# Patient Record
Sex: FEMALE | Race: WHITE | URBAN - METROPOLITAN AREA
[De-identification: names, ages, dates, MRNs, and addresses within clinical notes are randomized per-mention and may not be internally consistent; named-entity substitution may affect disease eponyms.]

---

## 2017-06-15 ENCOUNTER — THERAPY VISIT (OUTPATIENT)
Dept: PHYSICAL THERAPY | Facility: CLINIC | Age: 72
End: 2017-06-15
Payer: MEDICARE

## 2017-06-15 DIAGNOSIS — M79.662 PAIN OF LEFT LOWER LEG: Primary | ICD-10-CM

## 2017-06-15 PROCEDURE — 97110 THERAPEUTIC EXERCISES: CPT | Mod: GP | Performed by: PHYSICAL THERAPIST

## 2017-06-15 PROCEDURE — 97116 GAIT TRAINING THERAPY: CPT | Mod: GP | Performed by: PHYSICAL THERAPIST

## 2017-06-15 PROCEDURE — 97161 PT EVAL LOW COMPLEX 20 MIN: CPT | Mod: GP | Performed by: PHYSICAL THERAPIST

## 2017-06-15 PROCEDURE — G8979 MOBILITY GOAL STATUS: HCPCS | Mod: GP | Performed by: PHYSICAL THERAPIST

## 2017-06-15 PROCEDURE — G8978 MOBILITY CURRENT STATUS: HCPCS | Mod: GP | Performed by: PHYSICAL THERAPIST

## 2017-06-15 NOTE — PROGRESS NOTES
[facility] Initial Evaluation      Subjective:    Patient is a 71 year old female presenting with rehab back hpi.   Lori Amos is a 71 year old female with a lumbar (pelvis) condition.  Condition occurred with:  A fall/slip.  Condition occurred: in the community.  This is a new condition  Pt had a fall resulting in 3 pelvic fractures on R side when on a trip to [place] but recently began having recurrent L hip/leg pain saw MD on 6/14/17 for this issue..    Patient reports pain:  Lumbar spine left.  Radiates to:  Gluteals left.  Pain is described as sharp and is intermittent and reported as 5/10.  Associated symptoms:  Loss of strength and loss of balance. Pain is the same all the time.  Symptoms are exacerbated by certain positions, walking and twisting and relieved by rest.  Since onset symptoms are gradually improving.  Special tests:  X-ray.  Previous treatment includes physical therapy.  There was significant improvement following previous treatment.  General health as reported by patient is excellent.  Pertinent medical history includes:  Thyroid problems and depression.  Medical allergies: no.  Other surgeries include:  No.  Current medications:  Thyroid medication, pain medication and anti-depressants.  Current occupation is Retired RN.    Employment tasks: household activities.    Barriers include:  None as reported by the patient.    Red flags:  None as reported by the patient.        LUMBAR:    Posture: guarded sitting and standing posture with high guard posture during walking and R lateral lean    Neurological:    Motor Deficit:  Myotomes L R   L1-2 (hip flexion) 5/5 5/5   L3 (knee extension) 5/5 5/5   L4 (ankle DF) 5/5 5/5   L5 (g. toe ext)     S1 (ankle PF or knee flex) 5/5 5/5   MMT hip abd and ext 4-/5 bilaterally    Sensory Deficit, Reflexes: normal light touch senstion    Dural Signs:   L R   Slump neg neg   SLR + neg   Other:     AROM: (Major, Moderate, Minimal or Nil  loss)  Movement Loss Darryn Mod Min Nil Pain   Flexion    x    Extension    x    Side Gliding L   x  +   Side Gliding R    x      Repeated movement testing:   (During: produces, abolishes, increases, decreases, no effect, centralizing, peripheralizing; After: better, worse, no better, no worse, no effect, centralized, peripheralized)    Pre-test Symptoms Standing:    Symptoms During Symptoms After ROM increased ROM decreased No Effect   FIS     x   Rep FIS     x   EIS     x   Rep EIS     x     If required Pre-test Symptoms:    Symptoms During Symptoms After ROM increased ROM decreased No Effect   SGIS - L x       Rep SGIS - L x    same   SGIS - R     x   Rep SGIS - R     x     Static Tests: TTP L sacrum to greater tuberosity  Other Tests: difficulty with transfer sit<>stand and sup<>sit including lateral bed mobility due to pain, time spent discussing AD    Hip strengthening, aerobic activity as tolerated, gait with AD                      Objective:    System    Physical Exam    General     ROS    Assessment/Plan:      Patient is a 71 year old female with lumbar and sacral complaints.    Patient has the following significant findings with corresponding treatment plan.                Diagnosis 1:  L leg pain  Pain -  hot/cold therapy, manual therapy, education and home program  Decreased ROM/flexibility - manual therapy, therapeutic exercise, therapeutic activity and home program  Decreased joint mobility - manual therapy, therapeutic exercise, therapeutic activity and home program  Decreased strength - therapeutic exercise, therapeutic activities and home program  Impaired gait - gait training, assistive devices and home program    Therapy Evaluation Codes:   1) History comprised of:   Personal factors that impact the plan of care:      Coping style.    Comorbidity factors that impact the plan of care are:      Depression.     Medications impacting care: Anti-depressant and Pain.  2) Examination of Body Systems  comprised of:   Body structures and functions that impact the plan of care:      Lumbar spine.   Activity limitations that impact the plan of care are:      Stairs and Walking.  3) Clinical presentation characteristics are:   Stable/Uncomplicated.  4) Decision-Making    Low complexity using standardized patient assessment instrument and/or measureable assessment of functional outcome.  Cumulative Therapy Evaluation is: Low complexity.    Previous and current functional limitations:  (See Goal Flow Sheet for this information)    Short term and Long term goals: (See Goal Flow Sheet for this information)     Communication ability:  Patient appears to be able to clearly communicate and understand verbal and written communication and follow directions correctly.  Treatment Explanation - The following has been discussed with the patient:   RX ordered/plan of care  Anticipated outcomes  Possible risks and side effects  This patient would benefit from PT intervention to resume normal activities.   Rehab potential is good.    Frequency:  1 X week, once daily  Duration:  for 12 weeks  Discharge Plan:  Achieve all LTG.  Independent in home treatment program.  Return to previous functional level by discharge.  Reach maximal therapeutic benefit.    Please refer to the daily flowsheet for treatment today, total treatment time and time spent performing 1:1 timed codes.

## 2017-06-15 NOTE — LETTER
[facility]  [address]  [place] [address]  [phone]    2017    Re: Lori Amos   :   1945  MRN:  [id number]   REFERRING PHYSICIAN:   [facility]    Date of Initial Evaluation:  6/15/17  Visits:  Rxs Used: 1  Reason for Referral:  Pain of left lower leg    EVALUATION SUMMARY    [facility] Initial Evaluation    Subjective:  Patient is a 71 year old female presenting with rehab back hpi.   Lori Amos is a 71 year old female with a lumbar (pelvis) condition.  Condition occurred with:  A fall/slip.  Condition occurred: in the community.  This is a new condition  Pt had a fall resulting in 3 pelvic fractures on R side when on a trip to [place] but recently began having recurrent L hip/leg pain saw MD on 17 for this issue..    Patient reports pain:  Lumbar spine left.  Radiates to:  Gluteals left.  Pain is described as sharp and is intermittent and reported as 5/10.  Associated symptoms:  Loss of strength and loss of balance. Pain is the same all the time.  Symptoms are exacerbated by certain positions, walking and twisting and relieved by rest.  Since onset symptoms are gradually improving.  Special tests:  X-ray.  Previous treatment includes physical therapy.  There was significant improvement following previous treatment.  General health as reported by patient is excellent.  Pertinent medical history includes:  Thyroid problems and depression.  Medical allergies: no.  Other surgeries include:  No.  Current medications:  Thyroid medication, pain medication and anti-depressants.  Current occupation is Retired RN.    Employment tasks: household activities.    Barriers include:  None as reported by the patient.  Red flags:  None as reported by the patient.    LUMBAR:  Posture: guarded sitting and standing posture with high guard posture during walking and R lateral lean    Neurological:  Motor Deficit:  Myotomes  L R   L1-2 (hip flexion) 5/5 5/5   L3 (knee extension) 5/5 5/5   L4 (ankle DF) 5/5 5/5   L5 (g. toe ext)     S1 (ankle PF or knee flex) 5/5 5/5   MMT hip abd and ext 4-/5 bilaterally    Sensory Deficit, Reflexes: normal light touch senstion    Dural Signs:   L R   Slump neg neg   SLR + neg   Other:     AROM: (Major, Moderate, Minimal or Nil loss)  Movement Loss Darrny Mod Min Nil Pain   Flexion    x    Extension    x    Side Gliding L   x  +   Side Gliding R    x      Repeated movement testing:   (During: produces, abolishes, increases, decreases, no effect, centralizing, peripheralizing; After: better, worse, no better, no worse, no effect, centralized, peripheralized)    Pre-test Symptoms Standing:    Symptoms During Symptoms After ROM increased ROM decreased No Effect   FIS     x   Rep FIS     x   EIS     x   Rep EIS     x     If required Pre-test Symptoms:    Symptoms During Symptoms After ROM increased ROM decreased No Effect   SGIS - L x       Rep SGIS - L x    same   SGIS - R     x   Rep SGIS - R     x     Static Tests: TTP L sacrum to greater tuberosity  Other Tests: difficulty with transfer sit<>stand and sup<>sit including lateral bed mobility due to pain, time spent discussing AD  Hip strengthening, aerobic activity as tolerated, gait with AD    Assessment/Plan:    Patient is a 71 year old female with lumbar and sacral complaints.    Patient has the following significant findings with corresponding treatment plan.                Diagnosis 1:  L leg pain  Pain -  hot/cold therapy, manual therapy, education and home program  Decreased ROM/flexibility - manual therapy, therapeutic exercise, therapeutic activity and home program  Decreased joint mobility - manual therapy, therapeutic exercise, therapeutic activity and home program  Decreased strength - therapeutic exercise, therapeutic activities and home program  Impaired gait - gait training, assistive devices and home program    Therapy Evaluation Codes:    1) History comprised of:   Personal factors that impact the plan of care:      Coping style.    Comorbidity factors that impact the plan of care are:      Depression.     Medications impacting care: Anti-depressant and Pain.  2) Examination of Body Systems comprised of:   Body structures and functions that impact the plan of care:      Lumbar spine.   Activity limitations that impact the plan of care are:      Stairs and Walking.  3) Clinical presentation characteristics are:   Stable/Uncomplicated.  4) Decision-Making    Low complexity using standardized patient assessment instrument and/or measureable assessment of functional outcome.  Cumulative Therapy Evaluation is: Low complexity.    Previous and current functional limitations:  (See Goal Flow Sheet for this information)    Short term and Long term goals: (See Goal Flow Sheet for this information)     Communication ability:  Patient appears to be able to clearly communicate and understand verbal and written communication and follow directions correctly.  Treatment Explanation - The following has been discussed with the patient:   RX ordered/plan of care  Anticipated outcomes  Possible risks and side effects  This patient would benefit from PT intervention to resume normal activities.   Rehab potential is good.    Frequency:  1 X week, once daily  Duration:  for 12 weeks  Discharge Plan:  Achieve all LTG.  Independent in home treatment program.  Return to previous functional level by discharge.  Reach maximal therapeutic benefit.    Thank you for your referral.    INQUIRIES  Therapist: Christian David DPT [facility]  [address] [place] [address]  Phone: [phone]  Fax: [phone]

## 2017-06-15 NOTE — MR AVS SNAPSHOT
"              After Visit Summary   6/15/2017    Lori Amos    MRN: [id number]           Patient Information     Date Of Birth          1945        Visit Information        Provider Department      6/15/2017 4:50 PM Christian David, PT [facility]        Today's Diagnoses     Pain of left lower leg    -  1       Follow-ups after your visit        Your next 10 appointments already scheduled     Jun 22, 2017  4:50 PM CDT   TOR Extremity with Christian David, PT   [facility] ([facility]    [address]  [place] [address]   [phone]            Jun 29, 2017  4:10 PM CDT   TOR Extremity with Christian David, PT   [facility] ([facility]    [address]  [place] [address]   [phone]            Jul 06, 2017 10:20 AM CDT   TOR Extremity with Christian David, PT   [facility] ([facility]    [address]  [place] [address]   [phone]              Who to contact     If you have questions or need follow up information about today's clinic visit or your schedule please contact [facility] directly at [phone].  Normal or non-critical lab and imaging results will be communicated to you by [employer], letter or phone within 4 business days after the clinic has received the results. If you do not hear from us within 7 days, please contact the clinic through [employer] or phone. If you have a critical or abnormal lab result, we will notify you by phone as soon as possible.  Submit refill requests through [employer] or call your pharmacy and they will forward the refill request to us. Please allow 3 business days for your refill to be completed.          Additional Information About Your Visit        [employer] Information     [employer] lets you send messages to your doctor, view your test results, renew your prescriptions, schedule appointments and more. To sign up, go to [email or web address] . Click on \"Log in\" on " "the left side of the screen, which will take you to the Welcome page. Then click on \"Sign up Now\" on the right side of the page.     You will be asked to enter the access code listed below, as well as some personal information. Please follow the directions to create your username and password.     Your access code is: [email or web address]  Expires: 2017  6:52 PM     Your access code will  in 90 days. If you need help or a new code, please call your [facility] or [phone].        Care EveryWhere ID     This is your Care EveryWhere ID. This could be used by other organizations to access your [facility] medical records  [id number]         Blood Pressure from Last 3 Encounters:   No data found for BP    Weight from Last 3 Encounters:   No data found for Wt              We Performed the Following     GAIT TRAINING THERAPY     HC PT EVAL, LOW COMPLEXITY     TOR CERT REPORT     TOR INITIAL EVAL REPORT     Mobility current Status     Mobility goal Status     THERAPEUTIC EXERCISES        Primary Care Provider    None Specified       No primary provider on file.        Thank you!     Thank you for choosing [facility]  for your care. Our goal is always to provide you with excellent care. Hearing back from our patients is one way we can continue to improve our services. Please take a few minutes to complete the written survey that you may receive in the mail after your visit with us. Thank you!             Your Updated Medication List - Protect others around you: Learn how to safely use, store and throw away your medicines at www.disposemymeds.org.      Notice  As of 6/15/2017  6:52 PM    You have not been prescribed any medications.      "

## 2017-06-15 NOTE — LETTER
DEPARTMENT OF HEALTH AND HUMAN SERVICES  [facility] FOR MEDICARE & MEDICAID SERVICES    PLAN/UPDATED PLAN OF PROGRESS FOR OUTPATIENT REHABILITATION    PATIENTS NAME:  Lori Amos     : 1945    PROVIDER NUMBER:    [id number]    [facility]    PROVIDER NAME: [facility]    MEDICAL RECORD NUMBER: [id number]     START OF CARE DATE:  SOC Date: 06/15/17   TYPE:  PT    PRIMARY/TREATMENT DIAGNOSIS: (Pertinent Medical Diagnosis)  Pain of left lower leg    VISITS FROM START OF CARE:  Rxs Used: 1     [facility] Initial Evaluation    Subjective:  Patient is a 71 year old female presenting with rehab back hpi.   Lori Amos is a 71 year old female with a lumbar (pelvis) condition.  Condition occurred with:  A fall/slip.  Condition occurred: in the community.  This is a new condition  Pt had a fall resulting in 3 pelvic fractures on R side when on a trip to [place] but recently began having recurrent L hip/leg pain saw MD on 17 for this issue..    Patient reports pain:  Lumbar spine left.  Radiates to:  Gluteals left.  Pain is described as sharp and is intermittent and reported as 5/10.  Associated symptoms:  Loss of strength and loss of balance. Pain is the same all the time.  Symptoms are exacerbated by certain positions, walking and twisting and relieved by rest.  Since onset symptoms are gradually improving.  Special tests:  X-ray.  Previous treatment includes physical therapy.  There was significant improvement following previous treatment.  General health as reported by patient is excellent.  Pertinent medical history includes:  Thyroid problems and depression.  Medical allergies: no.  Other surgeries include:  No.  Current medications:  Thyroid medication, pain medication and anti-depressants.  Current occupation is Retired RN.    Employment tasks: household activities.  Barriers include:  None as reported by the patient.  Red flags:  None as reported by the  patient.    LUMBAR:    Posture: guarded sitting and standing posture with high guard posture during walking and R lateral lean    Neurological:    Motor Deficit:  Myotomes L R   L1-2 (hip flexion) 5/5 5/5   L3 (knee extension) 5/5 5/5   L4 (ankle DF) 5/5 5/5   L5 (g. toe ext)     S1 (ankle PF or knee flex) 5/5 5/5   MMT hip abd and ext 4-/5 bilaterally    Sensory Deficit, Reflexes: normal light touch senstion    Dural Signs:   L R   Slump neg neg   SLR + neg   Other:     AROM: (Major, Moderate, Minimal or Nil loss)  Movement Loss Darryn Mod Min Nil Pain   Flexion    x    Extension    x    Side Gliding L   x  +   Side Gliding R    x      Repeated movement testing:   (During: produces, abolishes, increases, decreases, no effect, centralizing, peripheralizing; After: better, worse, no better, no worse, no effect, centralized, peripheralized)    Pre-test Symptoms Standing:    Symptoms During Symptoms After ROM increased ROM decreased No Effect   FIS     x   Rep FIS     x   EIS     x   Rep EIS     x     If required Pre-test Symptoms:    Symptoms During Symptoms After ROM increased ROM decreased No Effect   SGIS - L x       Rep SGIS - L x    same   SGIS - R     x   Rep SGIS - R     x     Static Tests: TTP L sacrum to greater tuberosity    Other Tests: difficulty with transfer sit<>stand and sup<>sit including lateral bed mobility due to pain, time spent discussing AD    Hip strengthening, aerobic activity as tolerated, gait with AD    Assessment/Plan:    Patient is a 71 year old female with lumbar and sacral complaints.    Patient has the following significant findings with corresponding treatment plan.                Diagnosis 1:  L leg pain  Pain -  hot/cold therapy, manual therapy, education and home program  Decreased ROM/flexibility - manual therapy, therapeutic exercise, therapeutic activity and home program  Decreased joint mobility - manual therapy, therapeutic exercise, therapeutic activity and home  program  Decreased strength - therapeutic exercise, therapeutic activities and home program  Impaired gait - gait training, assistive devices and home program    Therapy Evaluation Codes:   1) History comprised of:   Personal factors that impact the plan of care:      Coping style.    Comorbidity factors that impact the plan of care are:      Depression.     Medications impacting care: Anti-depressant and Pain.  2) Examination of Body Systems comprised of:   Body structures and functions that impact the plan of care:      Lumbar spine.   Activity limitations that impact the plan of care are:      Stairs and Walking.  3) Clinical presentation characteristics are:   Stable/Uncomplicated.  4) Decision-Making    Low complexity using standardized patient assessment instrument and/or measureable assessment of functional outcome.    Cumulative Therapy Evaluation is: Low complexity.  Previous and current functional limitations:  (See Goal Flow Sheet for this information)    Short term and Long term goals: (See Goal Flow Sheet for this information)   Communication ability:  Patient appears to be able to clearly communicate and understand verbal and written communication and follow directions correctly.  Treatment Explanation - The following has been discussed with the patient:   RX ordered/plan of care  Anticipated outcomes  Possible risks and side effects  This patient would benefit from PT intervention to resume normal activities.   Rehab potential is good.    Frequency:  1 X week, once daily  Duration:  for 12 weeks  Discharge Plan:  Achieve all LTG.  Independent in home treatment program.  Return to previous functional level by discharge.  Reach maximal therapeutic benefit.      Caregiver Signature/Credentials _____________________________ Date ________       Treating Provider: Christian VICTORT   I have reviewed and certified the need for these services and plan of treatment while under my care.        PHYSICIAN'S  "SIGNATURE:   _________________________________________  Date___________   Sally Horta    Certification period:  Beginning of Cert date period: 06/15/17 to  End of Cert period date: 09/12/17     Functional Level Progress Report: Please see attached \"Goal Flow sheet for Functional level.\"    ____X____ Continue Services or       ________ DC Services                Service dates: From  SOC Date: 06/15/17 date to present                         "

## 2017-06-22 ENCOUNTER — THERAPY VISIT (OUTPATIENT)
Dept: PHYSICAL THERAPY | Facility: CLINIC | Age: 72
End: 2017-06-22
Payer: MEDICARE

## 2017-06-22 DIAGNOSIS — M79.662 PAIN OF LEFT LOWER LEG: ICD-10-CM

## 2017-06-22 PROCEDURE — 97140 MANUAL THERAPY 1/> REGIONS: CPT | Mod: GP | Performed by: PHYSICAL THERAPIST

## 2017-06-22 PROCEDURE — 97110 THERAPEUTIC EXERCISES: CPT | Mod: GP | Performed by: PHYSICAL THERAPIST

## 2017-07-06 ENCOUNTER — THERAPY VISIT (OUTPATIENT)
Dept: PHYSICAL THERAPY | Facility: CLINIC | Age: 72
End: 2017-07-06
Payer: MEDICARE

## 2017-07-06 DIAGNOSIS — M79.662 PAIN OF LEFT LOWER LEG: ICD-10-CM

## 2017-07-06 PROCEDURE — 97110 THERAPEUTIC EXERCISES: CPT | Mod: GP | Performed by: PHYSICAL THERAPIST

## 2017-07-06 PROCEDURE — 97140 MANUAL THERAPY 1/> REGIONS: CPT | Mod: GP | Performed by: PHYSICAL THERAPIST

## 2017-07-06 NOTE — MR AVS SNAPSHOT
"              After Visit Summary   7/6/2017    Lori Amos    MRN: [id number]           Patient Information     Date Of Birth          1945        Visit Information        Provider Department      7/6/2017 10:20 AM Christian David, PT [facility]        Today's Diagnoses     Pain of left lower leg           Follow-ups after your visit        Your next 10 appointments already scheduled     Jul 24, 2017 11:40 AM CDT   TOR Extremity with Christian David, PT   [facility] ([facility]    [address]  [place] [address]   [phone]            Aug 03, 2017  4:10 PM CDT   TOR Extremity with Christian David, PT   [facility] ([facility]    [address]  [place] [address]   [phone]            Aug 08, 2017 10:20 AM CDT   TOR Extremity with Christian David, PT   [facility] ([facility]    [address]  [place] [address]   [phone]              Who to contact     If you have questions or need follow up information about today's clinic visit or your schedule please contact [facility] directly at [phone].  Normal or non-critical lab and imaging results will be communicated to you by MyChart, letter or phone within 4 business days after the clinic has received the results. If you do not hear from us within 7 days, please contact the clinic through [facility] or phone. If you have a critical or abnormal lab result, we will notify you by phone as soon as possible.  Submit refill requests through G-CON or call your pharmacy and they will forward the refill request to us. Please allow 3 business days for your refill to be completed.          Additional Information About Your Visit        G-CON Information     G-CON lets you send messages to your doctor, view your test results, renew your prescriptions, schedule appointments and more. To sign up, go to [email or web address] . Click on \"Log in\" on the " "left side of the screen, which will take you to the Welcome page. Then click on \"Sign up Now\" on the right side of the page.     You will be asked to enter the access code listed below, as well as some personal information. Please follow the directions to create your username and password.     Your access code is: [email or web address]  Expires: 2017  6:52 PM     Your access code will  in 90 days. If you need help or a new code, please call your [facility] clinic or [phone].        Care EveryWhere ID     This is your Care EveryWhere ID. This could be used by other organizations to access your [facility] medical records  [id number]         Blood Pressure from Last 3 Encounters:   No data found for BP    Weight from Last 3 Encounters:   No data found for Wt              We Performed the Following     MANUAL THER TECH,1+REGIONS,EA 15 MIN     THERAPEUTIC EXERCISES        Primary Care Provider    None Specified       No primary provider on file.        Equal Access to Services     [facility]: [facility] [facility] [place], wabrianna lawrence, qaedilberto kaalmakenya torres, delfino lunsford . So [facility] [phone].    ATENCIÓN: Si habla español, tiene a purvis disposición servicios gratuitos de asistencia lingüística. Llame al [phone].    We comply with applicable federal civil rights laws and [place] laws. We do not discriminate on the basis of race, color, national origin, age, disability sex, sexual orientation or gender identity.            Thank you!     Thank you for choosing [facility]  for your care. Our goal is always to provide you with excellent care. Hearing back from our patients is one way we can continue to improve our services. Please take a few minutes to complete the written survey that you may receive in the mail after your visit with us. Thank you!             Your Updated Medication List - Protect others around you: Learn how to safely use, store and throw " away your medicines at www.disposemymeds.org.      Notice  As of 7/6/2017 11:31 AM    You have not been prescribed any medications.

## 2017-08-28 PROBLEM — M79.662 PAIN OF LEFT LOWER LEG: Status: RESOLVED | Noted: 2017-06-15 | Resolved: 2017-08-28

## 2017-08-28 NOTE — PROGRESS NOTES
Subjective:    HPI                    Objective:    System    Physical Exam    General     ROS    Assessment/Plan:      DISCHARGE REPORT    Progress reporting period is from 6/15/17 to 7/6/17.     SUBJECTIVE  Subjective: pt reports she has been hurting badly, ok in the morning but increased pain as the day goes on.   Current Pain level: 5/10   Initial Pain level: 5/10   Changes in function: No changes noted in function since last SOAP note   Adverse reactions: None;   ,     The objective findings are from DOS 7/6/17.    OBJECTIVE  Objective: increased L PSIS pain and glute max pain throughout session. pt emotional today about friends situations requiring encouragement and support throughout session.      ASSESSMENT/PLAN  Updated problem list and treatment plan: Diagnosis 1:  Hip and leg pain  STG/LTGs have been met or progress has been made towards goals:  None  Assessment of Progress: The patient's condition is unchanged.  Self Management Plans:  Patient has been instructed in a home treatment program.  Lori continues to require the following intervention to meet STG and LTG's: HEP    Recommendations:  This patient would benefit from further evaluation.    Please refer to the daily flowsheet for treatment today, total treatment time and time spent performing 1:1 timed codes.

## 2018-11-15 ENCOUNTER — RECORDS - HEALTHEAST (OUTPATIENT)
Dept: LAB | Facility: CLINIC | Age: 73
End: 2018-11-15

## 2018-11-16 LAB — PLATELET # BLD AUTO: 243 THOU/UL (ref 140–440)

## 2018-11-19 ENCOUNTER — RECORDS - HEALTHEAST (OUTPATIENT)
Dept: LAB | Facility: CLINIC | Age: 73
End: 2018-11-19

## 2018-11-20 LAB
ANION GAP SERPL CALCULATED.3IONS-SCNC: 9 MMOL/L (ref 5–18)
BASOPHILS # BLD AUTO: 0 THOU/UL (ref 0–0.2)
BASOPHILS NFR BLD AUTO: 1 % (ref 0–2)
BUN SERPL-MCNC: 12 MG/DL (ref 8–28)
CALCIUM SERPL-MCNC: 9.3 MG/DL (ref 8.5–10.5)
CHLORIDE BLD-SCNC: 106 MMOL/L (ref 98–107)
CO2 SERPL-SCNC: 27 MMOL/L (ref 22–31)
CREAT SERPL-MCNC: 0.47 MG/DL (ref 0.6–1.1)
EOSINOPHIL # BLD AUTO: 0.3 THOU/UL (ref 0–0.4)
EOSINOPHIL NFR BLD AUTO: 6 % (ref 0–6)
ERYTHROCYTE [DISTWIDTH] IN BLOOD BY AUTOMATED COUNT: 14.1 % (ref 11–14.5)
FERRITIN SERPL-MCNC: 78 NG/ML (ref 10–130)
GFR SERPL CREATININE-BSD FRML MDRD: >60 ML/MIN/1.73M2
GLUCOSE BLD-MCNC: 78 MG/DL (ref 70–125)
HCT VFR BLD AUTO: 38.3 % (ref 35–47)
HGB BLD-MCNC: 11.9 G/DL (ref 12–16)
LYMPHOCYTES # BLD AUTO: 1.2 THOU/UL (ref 0.8–4.4)
LYMPHOCYTES NFR BLD AUTO: 23 % (ref 20–40)
MCH RBC QN AUTO: 30.2 PG (ref 27–34)
MCHC RBC AUTO-ENTMCNC: 31.1 G/DL (ref 32–36)
MCV RBC AUTO: 97 FL (ref 80–100)
MONOCYTES # BLD AUTO: 0.5 THOU/UL (ref 0–0.9)
MONOCYTES NFR BLD AUTO: 9 % (ref 2–10)
NEUTROPHILS # BLD AUTO: 3.1 THOU/UL (ref 2–7.7)
NEUTROPHILS NFR BLD AUTO: 62 % (ref 50–70)
PLATELET # BLD AUTO: 274 THOU/UL (ref 140–440)
PMV BLD AUTO: 10 FL (ref 8.5–12.5)
POTASSIUM BLD-SCNC: 3.9 MMOL/L (ref 3.5–5)
RBC # BLD AUTO: 3.94 MILL/UL (ref 3.8–5.4)
SODIUM SERPL-SCNC: 142 MMOL/L (ref 136–145)
TSH SERPL DL<=0.005 MIU/L-ACNC: 5.23 UIU/ML (ref 0.3–5)
WBC: 5.1 THOU/UL (ref 4–11)

## 2018-11-22 ENCOUNTER — RECORDS - HEALTHEAST (OUTPATIENT)
Dept: LAB | Facility: CLINIC | Age: 73
End: 2018-11-22

## 2018-11-23 LAB — PLATELET # BLD AUTO: 267 THOU/UL (ref 140–440)

## 2018-11-29 ENCOUNTER — RECORDS - HEALTHEAST (OUTPATIENT)
Dept: LAB | Facility: CLINIC | Age: 73
End: 2018-11-29

## 2018-11-30 LAB — PLATELET # BLD AUTO: 217 THOU/UL (ref 140–440)

## 2018-12-13 ENCOUNTER — RECORDS - HEALTHEAST (OUTPATIENT)
Dept: LAB | Facility: CLINIC | Age: 73
End: 2018-12-13

## 2018-12-14 LAB — PLATELET # BLD AUTO: 215 THOU/UL (ref 140–440)

## 2019-01-21 ENCOUNTER — RECORDS - HEALTHEAST (OUTPATIENT)
Dept: LAB | Facility: CLINIC | Age: 74
End: 2019-01-21

## 2019-01-22 LAB
ANION GAP SERPL CALCULATED.3IONS-SCNC: 11 MMOL/L (ref 5–18)
BUN SERPL-MCNC: 16 MG/DL (ref 8–28)
CALCIUM SERPL-MCNC: 9.4 MG/DL (ref 8.5–10.5)
CHLORIDE BLD-SCNC: 107 MMOL/L (ref 98–107)
CO2 SERPL-SCNC: 25 MMOL/L (ref 22–31)
CREAT SERPL-MCNC: 0.52 MG/DL (ref 0.6–1.1)
GFR SERPL CREATININE-BSD FRML MDRD: >60 ML/MIN/1.73M2
GLUCOSE BLD-MCNC: 75 MG/DL (ref 70–125)
HGB BLD-MCNC: 11.2 G/DL (ref 12–16)
POTASSIUM BLD-SCNC: 3.9 MMOL/L (ref 3.5–5)
SODIUM SERPL-SCNC: 143 MMOL/L (ref 136–145)

## 2019-02-04 ENCOUNTER — RECORDS - HEALTHEAST (OUTPATIENT)
Dept: LAB | Facility: CLINIC | Age: 74
End: 2019-02-04

## 2019-02-05 LAB
CHOLEST SERPL-MCNC: 156 MG/DL
FASTING STATUS PATIENT QL REPORTED: YES
HDLC SERPL-MCNC: 45 MG/DL
LDLC SERPL CALC-MCNC: 88 MG/DL
TRIGL SERPL-MCNC: 114 MG/DL
TSH SERPL DL<=0.005 MIU/L-ACNC: 5.07 UIU/ML (ref 0.3–5)

## 2019-03-05 ENCOUNTER — RECORDS - HEALTHEAST (OUTPATIENT)
Dept: LAB | Facility: CLINIC | Age: 74
End: 2019-03-05

## 2019-03-06 LAB
ANION GAP SERPL CALCULATED.3IONS-SCNC: 6 MMOL/L (ref 5–18)
BUN SERPL-MCNC: 19 MG/DL (ref 8–28)
CALCIUM SERPL-MCNC: 9.5 MG/DL (ref 8.5–10.5)
CHLORIDE BLD-SCNC: 105 MMOL/L (ref 98–107)
CO2 SERPL-SCNC: 32 MMOL/L (ref 22–31)
CREAT SERPL-MCNC: 0.58 MG/DL (ref 0.6–1.1)
GFR SERPL CREATININE-BSD FRML MDRD: >60 ML/MIN/1.73M2
GLUCOSE BLD-MCNC: 74 MG/DL (ref 70–125)
HGB BLD-MCNC: 11.6 G/DL (ref 12–16)
POTASSIUM BLD-SCNC: 4.2 MMOL/L (ref 3.5–5)
SODIUM SERPL-SCNC: 143 MMOL/L (ref 136–145)

## 2019-06-03 ENCOUNTER — RECORDS - HEALTHEAST (OUTPATIENT)
Dept: LAB | Facility: CLINIC | Age: 74
End: 2019-06-03

## 2019-06-04 LAB
AST SERPL W P-5'-P-CCNC: 25 U/L (ref 0–40)
CHOLEST SERPL-MCNC: 172 MG/DL
FASTING STATUS PATIENT QL REPORTED: YES
HDLC SERPL-MCNC: 48 MG/DL
LDLC SERPL CALC-MCNC: 105 MG/DL
TRIGL SERPL-MCNC: 95 MG/DL